# Patient Record
Sex: MALE | Race: WHITE | NOT HISPANIC OR LATINO | Employment: FULL TIME | URBAN - METROPOLITAN AREA
[De-identification: names, ages, dates, MRNs, and addresses within clinical notes are randomized per-mention and may not be internally consistent; named-entity substitution may affect disease eponyms.]

---

## 2017-04-21 ENCOUNTER — COMMUNICATION - HEALTHEAST (OUTPATIENT)
Dept: FAMILY MEDICINE | Facility: CLINIC | Age: 32
End: 2017-04-21

## 2017-04-21 DIAGNOSIS — J30.9 ALLERGIC RHINITIS: ICD-10-CM

## 2017-12-02 ENCOUNTER — APPOINTMENT (OUTPATIENT)
Dept: GENERAL RADIOLOGY | Facility: CLINIC | Age: 32
End: 2017-12-02
Attending: EMERGENCY MEDICINE
Payer: COMMERCIAL

## 2017-12-02 ENCOUNTER — HOSPITAL ENCOUNTER (EMERGENCY)
Facility: CLINIC | Age: 32
Discharge: HOME | End: 2017-12-02
Attending: EMERGENCY MEDICINE | Admitting: EMERGENCY MEDICINE
Payer: COMMERCIAL

## 2017-12-02 VITALS
OXYGEN SATURATION: 97 % | WEIGHT: 170 LBS | SYSTOLIC BLOOD PRESSURE: 123 MMHG | HEIGHT: 75 IN | RESPIRATION RATE: 16 BRPM | BODY MASS INDEX: 21.14 KG/M2 | DIASTOLIC BLOOD PRESSURE: 78 MMHG | TEMPERATURE: 98.3 F

## 2017-12-02 DIAGNOSIS — S33.5XXA LUMBAR SPRAIN, INITIAL ENCOUNTER: ICD-10-CM

## 2017-12-02 DIAGNOSIS — S16.1XXA STRAIN OF NECK MUSCLE, INITIAL ENCOUNTER: ICD-10-CM
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
## 2017-12-02 PROCEDURE — 99284 EMERGENCY DEPT VISIT MOD MDM: CPT | Mod: Z6 | Performed by: EMERGENCY MEDICINE

## 2017-12-02 PROCEDURE — 72040 X-RAY EXAM NECK SPINE 2-3 VW: CPT | Mod: TC

## 2017-12-02 PROCEDURE — 99285 EMERGENCY DEPT VISIT HI MDM: CPT | Mod: 25 | Performed by: EMERGENCY MEDICINE

## 2017-12-02 PROCEDURE — 72100 X-RAY EXAM L-S SPINE 2/3 VWS: CPT | Mod: TC

## 2017-12-02 RX ORDER — NAPROXEN 500 MG/1
500 TABLET ORAL 2 TIMES DAILY PRN
Qty: 30 TABLET | Refills: 1 | Status: SHIPPED | OUTPATIENT
Start: 2017-12-02 | End: 2019-02-11

## 2017-12-02 RX ORDER — CYCLOBENZAPRINE HCL 10 MG
5-10 TABLET ORAL 3 TIMES DAILY PRN
Qty: 30 TABLET | Refills: 1 | Status: SHIPPED | OUTPATIENT
Start: 2017-12-02 | End: 2019-02-11
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
## 2017-12-02 NOTE — ED AVS SNAPSHOT
[facility] Emergency Department    [address] [place] [address]    Phone:  [phone]    Fax:  [phone]                                       Dakota Oliva   MRN: [id number]    Department:  [facility] Emergency Department   Date of Visit:  12/2/2017           After Visit Summary Signature Page     I have received my discharge instructions, and my questions have been answered. I have discussed any challenges I see with this plan with the nurse or doctor.    ..........................................................................................................................................  Patient/Patient Representative Signature      ..........................................................................................................................................  Patient Representative Print Name and Relationship to Patient    ..................................................               ................................................  Date                                            Time    ..........................................................................................................................................  Reviewed by Signature/Title    ...................................................              ..............................................  Date                                                            Time
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
## 2018-12-06 ENCOUNTER — TELEPHONE (OUTPATIENT)
Dept: FAMILY MEDICINE | Facility: CLINIC | Age: 33
End: 2018-12-06

## 2018-12-06 NOTE — TELEPHONE ENCOUNTER
Left message for patient to return call, per Dr. Arrieta. Please transfer when patient returns call.  Ashley Sanders MA

## 2019-01-09 ENCOUNTER — TELEPHONE (OUTPATIENT)
Dept: FAMILY MEDICINE | Facility: CLINIC | Age: 34
End: 2019-01-09

## 2019-01-09 DIAGNOSIS — Z31.41 FERTILITY TESTING: Primary | ICD-10-CM

## 2019-01-14 DIAGNOSIS — Z31.41 FERTILITY TESTING: ICD-10-CM

## 2019-01-14 PROCEDURE — 89322 SEMEN ANAL STRICT CRITERIA: CPT

## 2019-01-15 LAB
ABNORMAL SPERM: 99 MORPHOLOGY
ABSTINENCE DAYS: 3 DAYS (ref 2–7)
AGGLUTINATION: NO YES/NO
ANALYSIS TEMP - CENTIGRADE: 23 CENTIGRADE
CELL FRAGMENTS: ABNORMAL %
COLLECTION METHOD: ABNORMAL
COLLECTION SITE: ABNORMAL
CONSENT TO RELEASE TO PARTNER: YES
HEAD DEFECT: 99
IMMATURE SPERM: ABNORMAL %
IMMOTILE: 47 %
LAB RECEIPT TIME: ABNORMAL
LIQUEFIED: YES YES/NO
MIDPIECE DEFECT: 38
NON-PROGRESSIVE MOTILITY: 3 %
NORMAL SPERM: 1 % NORMAL FORMS (ref 4–?)
PROGRESSIVE MOTILITY: 50 % (ref 32–?)
ROUND CELLS: 0.6 MILLION/ML (ref ?–2)
SPECIMEN CONCENTRATION: 22 MILLION/ML (ref 15–?)
SPECIMEN PH: 7.2 PH (ref 7.2–?)
SPECIMEN TYPE: ABNORMAL
SPECIMEN VOL UR: 4.5 ML (ref 1.5–?)
TAIL DEFECT: 12
TIME OF ANALYSIS: ABNORMAL
TOTAL NUMBER: 99 MILLION (ref 39–?)
TOTAL PROGRESSIVE MOTILE: 50 MILLION (ref 15.6–?)
VISCOUS: NO YES/NO
VITALITY: ABNORMAL % (ref 58–?)
WBC SPECIMEN: ABNORMAL %

## 2019-01-16 NOTE — RESULT ENCOUNTER NOTE
Marya Please inform Dakota/ or caretaker  that this result(s) is/are not quite normal- the counts are low- please refer to Dr Shipman or if he prefers to go to a reproductive endocrinology clinic he can be referred to [facility]-Thanks. PAPO Ruiz MD

## 2019-01-17 ENCOUNTER — TELEPHONE (OUTPATIENT)
Dept: FAMILY MEDICINE | Facility: CLINIC | Age: 34
End: 2019-01-17

## 2019-01-17 DIAGNOSIS — N46.9 MALE FERTILITY PROBLEM: Primary | ICD-10-CM

## 2019-01-18 NOTE — TELEPHONE ENCOUNTER
Referral placed for Urology, [facility]/male infertility program. Message sent to scheduling who will contact patient with the appointment. Vannesa Vasquez LPN

## 2019-01-18 NOTE — TELEPHONE ENCOUNTER
Pt informed of results. He would like to see Reproductive Endocrinology at the [facility] .   Thank you,  Grace Small- Pt Rep.

## 2019-01-18 NOTE — TELEPHONE ENCOUNTER
Left message for patient to return call to clinic.    Please inform patient of abnormal/low sperm counts on recent semen analysis and inquire about which referral patient would like to have.  Marya Mcnamara, CMA

## 2019-01-21 ENCOUNTER — TELEPHONE (OUTPATIENT)
Dept: UROLOGY | Facility: CLINIC | Age: 34
End: 2019-01-21

## 2019-01-21 DIAGNOSIS — Z31.69 INFERTILITY COUNSELING: Primary | ICD-10-CM

## 2019-01-21 NOTE — TELEPHONE ENCOUNTER
I called patient to assist in scheduling appointment with Dr. Gonzalez. No answer. Generic voicemail left asking for a return call.   Will go over the previsit information when patient returns call.

## 2019-01-28 DIAGNOSIS — Z31.69 INFERTILITY COUNSELING: ICD-10-CM

## 2019-01-28 LAB — FSH SERPL-ACNC: 3.2 IU/L (ref 0.7–10.8)

## 2019-01-28 PROCEDURE — 36415 COLL VENOUS BLD VENIPUNCTURE: CPT | Performed by: UROLOGY

## 2019-01-28 PROCEDURE — 84403 ASSAY OF TOTAL TESTOSTERONE: CPT | Performed by: UROLOGY

## 2019-01-28 PROCEDURE — 83001 ASSAY OF GONADOTROPIN (FSH): CPT | Performed by: UROLOGY

## 2019-01-28 PROCEDURE — 84270 ASSAY OF SEX HORMONE GLOBUL: CPT | Performed by: UROLOGY

## 2019-01-28 PROCEDURE — 82670 ASSAY OF TOTAL ESTRADIOL: CPT | Performed by: UROLOGY

## 2019-01-30 LAB
SHBG SERPL-SCNC: 42 NMOL/L (ref 11–80)
TESTOST FREE SERPL-MCNC: 13.73 NG/DL (ref 4.7–24.4)
TESTOST SERPL-MCNC: 713 NG/DL (ref 240–950)

## 2019-02-05 LAB — ESTRADIOL SERPL HS-MCNC: 28 PG/ML (ref 10–40)

## 2019-02-11 ENCOUNTER — OFFICE VISIT (OUTPATIENT)
Dept: UROLOGY | Facility: CLINIC | Age: 34
End: 2019-02-11
Payer: COMMERCIAL

## 2019-02-11 VITALS — SYSTOLIC BLOOD PRESSURE: 116 MMHG | DIASTOLIC BLOOD PRESSURE: 72 MMHG | HEART RATE: 85 BPM | OXYGEN SATURATION: 100 %

## 2019-02-11 DIAGNOSIS — R86.8 TERATOSPERMIA: ICD-10-CM

## 2019-02-11 DIAGNOSIS — Z31.41 FERTILITY TESTING: Primary | ICD-10-CM

## 2019-02-11 PROCEDURE — 99203 OFFICE O/P NEW LOW 30 MIN: CPT | Performed by: UROLOGY

## 2019-02-11 RX ORDER — MIRTAZAPINE 30 MG/1
1 TABLET, FILM COATED ORAL PRN
COMMUNITY
Start: 2016-03-24 | End: 2023-12-02

## 2019-02-11 RX ORDER — TRAZODONE HYDROCHLORIDE 50 MG/1
2 TABLET, FILM COATED ORAL PRN
COMMUNITY
Start: 2016-04-10 | End: 2023-12-02

## 2019-02-11 RX ORDER — DESVENLAFAXINE 25 MG/1
25 TABLET, EXTENDED RELEASE ORAL DAILY
COMMUNITY
Start: 2019-02-08 | End: 2023-12-02

## 2019-02-11 ASSESSMENT — PAIN SCALES - GENERAL: PAINLEVEL: NO PAIN (0)

## 2019-02-11 NOTE — PATIENT INSTRUCTIONS
[facility]  [address]  [place] [address]  [phone]

## 2019-02-11 NOTE — RESULT ENCOUNTER NOTE
Dear Dakota,   Here are your recent results.     Blood labs are all normal, no concerns.    Calculated free ( active) testosterone is 13.7 ng/dL ( >6.5 is preferred), so testosterone level looks great.   There is a normal testosterone to estrogen ratio.  FSH is the signal from the brain to the testicles to drive sperm production.  Your FSH level is normal, I prefer to see this under 7.5 or so.    Thank You  Let me know if you have any questions.    He TY

## 2019-02-11 NOTE — NURSING NOTE
Dakota Oliva's goals for this visit include:   Chief Complaint   Patient presents with     Consult     Infertility       He requests these members of his care team be copied on today's visit information: Yes    PCP: No Ref-Primary, Physician    Referring Provider:  Steven Ruiz MD  [address] [place] [address]    /72 (BP Location: Left arm, Patient Position: Sitting, Cuff Size: Adult Regular)   Pulse 85   SpO2 100%     Do you need any medication refills at today's visit? No

## 2019-02-11 NOTE — PROGRESS NOTES
Dear Dr. Ruiz, it was my pleasure to see . Dakota Oliva, a 33 year old male here in consultation today for fertility evaluation.  His spouse is Iris Oliva age 32 (4/3/87).    This couple has been attempting to conceive for the last 1.5 yrs. They have no previous pregnancy together.  Pregnancies with other partners: none for certain.  They have tried timed intercourse. They have not tried IUI or IVF.    Female factors suspected: None known.  Cycles are regular.  They are concerned about possible endometriosis for her.    PAST MEDICAL HISTORY:    Past Medical History:   Diagnosis Date     Acne      JHONNY (obstructive sleep apnea)      Puberty normal   No associated conditions such as ED or sexual dysfunction.  No  problems.     PAST SURG HISTORY  Past Surgical History:   Procedure Laterality Date     ENT SURGERY  1989    posterior pharyngeal flap surgery    no inguinal hernia repair     Medications as of 2/11/2019:  Current Outpatient Medications   Medication Sig     CLONAZEPAM PO Take 0.5 mg by mouth 3 times daily     desvenlafaxine succinate (PRISTIQ) 25 MG 24 hr tablet Take 25 mg by mouth daily     fluticasone (FLONASE) 50 MCG/ACT nasal spray Spray 2 sprays into both nostrils daily     mirtazapine (REMERON) 30 MG tablet Take 1 tablet by mouth as needed     Multiple Vitamins-Minerals (CENTRUM MEN PO) Take 1 tablet by mouth daily     traZODone (DESYREL) 50 MG tablet Take 2 tablets by mouth as needed     No current facility-administered medications for this visit.       ALLERGY:   No Known Allergies    SOCIAL HISTORY:  . Occupation: dentist.  No alcohol abuse,  tobacco use.   Social History     Tobacco Use     Smoking status: Never Smoker     Smokeless tobacco: Never Used   Substance Use Topics     Alcohol use: Yes     Drug use: No         FAMILY HISTORY: No inherited disorders.   Family History   Problem Relation Age of Onset     Hypertension Mother      Hypertension Father      Cancer Maternal  Grandmother         pancreatic     Cerebrovascular Disease Paternal Grandfather      Cardiovascular Maternal Grandfather        REVIEW OF SYSTEMS:  Significant for feeling well at present.    Denies erectile dysfunction, ejaculatory problems, testicular pain. No vision or smell deficits, no chronic sinus or respiratory infections. No recent febrile illness, weight loss. No heat or cold intolerance, gynecomastia, or other endocrine complaints.    Otherwise, no constitutional, eye, ENT, heart, lung, GI , musculoskeletal, skin, neurologic, psychiatric, or hematologic complaints.    GONADOTOXIN EXPOSURE: + for sauna. Otherwise negative for marijuana, chemicals, pesticides, heavy metals, steroids, chemotherapy or radiation.    GENERAL PHYSICAL EXAM  /72 (BP Location: Left arm, Patient Position: Sitting, Cuff Size: Adult Regular)   Pulse 85   SpO2 100%    Constitutional: No acute distress. Well nourished.   PSYCH: normal mood and affect.  NEURO: normal gait, no focal deficits.   EYES: anicteric, EOMI, PERR  ENT: neck supple,  mucosae moist, no thrush.  CARDIOPULMONARY: breathing non-labored, pulse regular, no peripheral edema.  GI: Abdomen soft, non-tender, nondistended   MUSCULOSKELETAL: normal limb proportions, no muscle wasting, no contractures.  SKIN: Normal virilized hair distribution, no lesions, warts or rashes over genitalia, abdomen extremities or face.  HEME/LYMPH: no ecchymosis, no lymphadenopathy in groin or neck, no lymphedema.     EXAM:  Phallus circumcised, meatus adequate, no plaques palpated.   Left testis descended , size is 15 cc , consistency is normal . No intra-testicular masses.   Right testis descended , size is 15 cc , consistency is normal . No intra-testicular masses.   Epididymes present, non-tender, not enlarged.   Left cord: Vas present. No varicocele noted.  Right cord: Vas present. No varicocele noted. Right cord is slightly almonte but no varicocele noted.    Rectal exam deferred.      Component      Latest Ref Rng & Units 1/14/2019 1/28/2019   Collection Method       Masturbation    Collection Site       TIM    Specimen Type       Semen    Lab Receipt Time       11:24 AM    Time of Analysis       11:40 AM    Analysis Temp - Centigrade      centigrade 23    Abstinence days      2 - 7 days 3    Liquefied      yes/no Yes    Viscous      yes/no No    Agglutination      yes/no No    pH      7.2 pH 7.2    Volume      1.5 ml 4.5    Concentration      15 million/ml 22    Total Number      39 million 99    Progressive motility      32 % 50    Non-progressive motility      % 3    Immotile      % 47    Total Progressive Motile      15.6 million 50    Normal Sperm      4 % normal forms 1 (A)    Abnormal Sperm      morphology 99    Head Defect       99    Midpiece Defect       38    Tail Defect       12    Round Cells      2 million/ml 0.6    Consent to Release to Partner       Yes    Testosterone Total      240 - 950 ng/dL  713   Sex Hormone Binding Globulin      11 - 80 nmol/L  42   Free Testosterone Calculated      4.7 - 24.4 ng/dL  13.73   FSH      0.7 - 10.8 IU/L  3.2   Estradiol Ultrasensitive      10 - 40 pg/mL  28       ASSESSMENT:    Fertility Testing.    Testicular hypofunction     No varicoceles noted    PLAN:    Hormonal panel    Repeat SA at his convenience.    Discussed there is uncertain effect that PRISTIQ may have on sperm function.  There is some evidence of increased sperm DNA fragmentation in men taking Paxil.    I will contact him with results and plan/options.      Thank-you for allowing me to care for your patient.  Sincerely,    Leonardo Gonzalez MD      CC: TAURUS

## 2019-02-11 NOTE — LETTER
2/11/2019       RE: Dakota Oliva  [address] [facility] [id number]     Dear Colleague,    Thank you for referring your patient, Dakota Oliva, to the [facility]. Please see a copy of my visit note below.    Dear Dr. Ruiz, it was my pleasure to see Mr. Dakota Oliva, a 33 year old male here in consultation today for fertility evaluation.  His spouse is Iris Oliva age 32 (4/3/87).    This couple has been attempting to conceive for the last 1.5 yrs. They have no previous pregnancy together.  Pregnancies with other partners: none for certain.  They have tried timed intercourse. They have not tried IUI or IVF.    Female factors suspected: None known.  Cycles are regular.  They are concerned about possible endometriosis for her.    PAST MEDICAL HISTORY:    Past Medical History:   Diagnosis Date     Acne      JHONNY (obstructive sleep apnea)      Puberty normal   No associated conditions such as ED or sexual dysfunction.  No  problems.     PAST SURG HISTORY  Past Surgical History:   Procedure Laterality Date     ENT SURGERY  1989    posterior pharyngeal flap surgery    no inguinal hernia repair     Medications as of 2/11/2019:  Current Outpatient Medications   Medication Sig     CLONAZEPAM PO Take 0.5 mg by mouth 3 times daily     desvenlafaxine succinate (PRISTIQ) 25 MG 24 hr tablet Take 25 mg by mouth daily     fluticasone (FLONASE) 50 MCG/ACT nasal spray Spray 2 sprays into both nostrils daily     mirtazapine (REMERON) 30 MG tablet Take 1 tablet by mouth as needed     Multiple Vitamins-Minerals (CENTRUM MEN PO) Take 1 tablet by mouth daily     traZODone (DESYREL) 50 MG tablet Take 2 tablets by mouth as needed     No current facility-administered medications for this visit.       ALLERGY:   No Known Allergies    SOCIAL HISTORY:  . Occupation: dentist.  No alcohol abuse,  tobacco use.   Social History     Tobacco Use     Smoking status:  Never Smoker     Smokeless tobacco: Never Used   Substance Use Topics     Alcohol use: Yes     Drug use: No         FAMILY HISTORY: No inherited disorders.   Family History   Problem Relation Age of Onset     Hypertension Mother      Hypertension Father      Cancer Maternal Grandmother         pancreatic     Cerebrovascular Disease Paternal Grandfather      Cardiovascular Maternal Grandfather        REVIEW OF SYSTEMS:  Significant for feeling well at present.    Denies erectile dysfunction, ejaculatory problems, testicular pain. No vision or smell deficits, no chronic sinus or respiratory infections. No recent febrile illness, weight loss. No heat or cold intolerance, gynecomastia, or other endocrine complaints.    Otherwise, no constitutional, eye, ENT, heart, lung, GI , musculoskeletal, skin, neurologic, psychiatric, or hematologic complaints.    GONADOTOXIN EXPOSURE: + for sauna. Otherwise negative for marijuana, chemicals, pesticides, heavy metals, steroids, chemotherapy or radiation.    GENERAL PHYSICAL EXAM  /72 (BP Location: Left arm, Patient Position: Sitting, Cuff Size: Adult Regular)   Pulse 85   SpO2 100%    Constitutional: No acute distress. Well nourished.   PSYCH: normal mood and affect.  NEURO: normal gait, no focal deficits.   EYES: anicteric, EOMI, PERR  ENT: neck supple,  mucosae moist, no thrush.  CARDIOPULMONARY: breathing non-labored, pulse regular, no peripheral edema.  GI: Abdomen soft, non-tender, nondistended   MUSCULOSKELETAL: normal limb proportions, no muscle wasting, no contractures.  SKIN: Normal virilized hair distribution, no lesions, warts or rashes over genitalia, abdomen extremities or face.  HEME/LYMPH: no ecchymosis, no lymphadenopathy in groin or neck, no lymphedema.     EXAM:  Phallus circumcised, meatus adequate, no plaques palpated.   Left testis descended , size is 15 cc , consistency is normal . No intra-testicular masses.   Right testis descended , size is 15 cc  , consistency is normal . No intra-testicular masses.   Epididymes present, non-tender, not enlarged.   Left cord: Vas present. No varicocele noted.  Right cord: Vas present. No varicocele noted. Right cord is slightly almonte but no varicocele noted.    Rectal exam deferred.     Component      Latest Ref Rng & Units 1/14/2019 1/28/2019   Collection Method       Masturbation    Collection Site       TIM    Specimen Type       Semen    Lab Receipt Time       11:24 AM    Time of Analysis       11:40 AM    Analysis Temp - Centigrade      centigrade 23    Abstinence days      2 - 7 days 3    Liquefied      yes/no Yes    Viscous      yes/no No    Agglutination      yes/no No    pH      7.2 pH 7.2    Volume      1.5 ml 4.5    Concentration      15 million/ml 22    Total Number      39 million 99    Progressive motility      32 % 50    Non-progressive motility      % 3    Immotile      % 47    Total Progressive Motile      15.6 million 50    Normal Sperm      4 % normal forms 1 (A)    Abnormal Sperm      morphology 99    Head Defect       99    Midpiece Defect       38    Tail Defect       12    Round Cells      2 million/ml 0.6    Consent to Release to Partner       Yes    Testosterone Total      240 - 950 ng/dL  713   Sex Hormone Binding Globulin      11 - 80 nmol/L  42   Free Testosterone Calculated      4.7 - 24.4 ng/dL  13.73   FSH      0.7 - 10.8 IU/L  3.2   Estradiol Ultrasensitive      10 - 40 pg/mL  28       ASSESSMENT:    Fertility Testing.    Testicular hypofunction     No varicoceles noted    PLAN:    Hormonal panel    Repeat SA at his convenience.    Discussed there is uncertain effect that PRISTIQ may have on sperm function.  There is some evidence of increased sperm DNA fragmentation in men taking Paxil.    I will contact him with results and plan/options.      Thank-you for allowing me to care for your patient.  Sincerely,    Leonardo Gonzalez MD      CC: OGI    Again, thank you for allowing me to participate in  the care of your patient.      Sincerely,    Leonardo Gonzalez MD

## 2020-02-20 ENCOUNTER — TELEPHONE (OUTPATIENT)
Dept: UROLOGY | Facility: CLINIC | Age: 35
End: 2020-02-20

## 2020-02-20 NOTE — TELEPHONE ENCOUNTER
Received notification that patient is overdue for semen analysis, strict morphology. Left generic voicemail for patient to return call to clinic. When patient returns call, will give patient the number to set this lab appointment up, or will cancel the order if the patient is no longer interested.    Dianelys Hill LPN

## 2020-02-21 NOTE — TELEPHONE ENCOUNTER
Second attempt to reach patient for overdue labs. Phone line was busy so was unable to leave a message.    Dianelys Hill LPN

## 2020-03-02 ENCOUNTER — HEALTH MAINTENANCE LETTER (OUTPATIENT)
Age: 35
End: 2020-03-02

## 2020-12-20 ENCOUNTER — HEALTH MAINTENANCE LETTER (OUTPATIENT)
Age: 35
End: 2020-12-20

## 2021-04-18 ENCOUNTER — HEALTH MAINTENANCE LETTER (OUTPATIENT)
Age: 36
End: 2021-04-18

## 2021-10-03 ENCOUNTER — HEALTH MAINTENANCE LETTER (OUTPATIENT)
Age: 36
End: 2021-10-03

## 2022-03-01 NOTE — TELEPHONE ENCOUNTER
----- Message from Steven Ruiz MD sent at 1/16/2019  2:27 PM CST -----  Marya Please inform Dakota/ or caretaker  that this result(s) is/are not quite normal- the counts are low- please refer to Dr Shipman or if he prefers to go to a reproductive endocrinology clinic he can be referred to [facility]-Thanks. PAPO Ruiz MD     [facility] Pregnancy And Lactation Text: This medication is Pregnancy Category B and is considered safe. There is some data to suggest avoiding during the third trimester. It is unknown if this medication is excreted in breast milk.

## 2022-05-15 ENCOUNTER — HEALTH MAINTENANCE LETTER (OUTPATIENT)
Age: 37
End: 2022-05-15

## 2022-09-10 ENCOUNTER — HEALTH MAINTENANCE LETTER (OUTPATIENT)
Age: 37
End: 2022-09-10

## 2023-06-03 ENCOUNTER — HEALTH MAINTENANCE LETTER (OUTPATIENT)
Age: 38
End: 2023-06-03

## 2023-12-02 ENCOUNTER — HOSPITAL ENCOUNTER (EMERGENCY)
Facility: CLINIC | Age: 38
Discharge: HOME | End: 2023-12-02
Attending: FAMILY MEDICINE | Admitting: FAMILY MEDICINE
Payer: COMMERCIAL

## 2023-12-02 ENCOUNTER — APPOINTMENT (OUTPATIENT)
Dept: CT IMAGING | Facility: CLINIC | Age: 38
End: 2023-12-02
Attending: FAMILY MEDICINE
Payer: COMMERCIAL

## 2023-12-02 VITALS
BODY MASS INDEX: 25 KG/M2 | RESPIRATION RATE: 18 BRPM | WEIGHT: 200 LBS | OXYGEN SATURATION: 96 % | DIASTOLIC BLOOD PRESSURE: 94 MMHG | SYSTOLIC BLOOD PRESSURE: 135 MMHG | HEART RATE: 111 BPM | TEMPERATURE: 100.3 F

## 2023-12-02 DIAGNOSIS — R11.2 NAUSEA AND VOMITING, UNSPECIFIED VOMITING TYPE: ICD-10-CM

## 2023-12-02 DIAGNOSIS — Z98.890 POSTOPERATIVE STATE: ICD-10-CM

## 2023-12-02 DIAGNOSIS — U07.1 COVID-19 VIRUS INFECTION: ICD-10-CM

## 2023-12-02 DIAGNOSIS — R11.2 NAUSEA AND VOMITING, UNSPECIFIED VOMITING TYPE: Primary | ICD-10-CM

## 2023-12-02 LAB
ALBUMIN SERPL BCG-MCNC: 4.2 G/DL (ref 3.5–5.2)
ALP SERPL-CCNC: 133 U/L (ref 40–150)
ALT SERPL W P-5'-P-CCNC: 37 U/L (ref 0–70)
ANION GAP SERPL CALCULATED.3IONS-SCNC: 12 MMOL/L (ref 7–15)
AST SERPL W P-5'-P-CCNC: 29 U/L (ref 0–45)
BASOPHILS # BLD AUTO: 0 10E3/UL (ref 0–0.2)
BASOPHILS NFR BLD AUTO: 0 %
BILIRUB SERPL-MCNC: 1 MG/DL
BUN SERPL-MCNC: 7 MG/DL (ref 6–20)
CALCIUM SERPL-MCNC: 9.2 MG/DL (ref 8.6–10)
CHLORIDE SERPL-SCNC: 99 MMOL/L (ref 98–107)
CREAT SERPL-MCNC: 1.05 MG/DL (ref 0.67–1.17)
CRP SERPL-MCNC: 240.14 MG/L
DEPRECATED HCO3 PLAS-SCNC: 24 MMOL/L (ref 22–29)
EGFRCR SERPLBLD CKD-EPI 2021: >90 ML/MIN/1.73M2
EOSINOPHIL # BLD AUTO: 0.2 10E3/UL (ref 0–0.7)
EOSINOPHIL NFR BLD AUTO: 3 %
ERYTHROCYTE [DISTWIDTH] IN BLOOD BY AUTOMATED COUNT: 13.3 % (ref 10–15)
GLUCOSE SERPL-MCNC: 120 MG/DL (ref 70–99)
HCT VFR BLD AUTO: 36.2 % (ref 40–53)
HGB BLD-MCNC: 12.1 G/DL (ref 13.3–17.7)
IMM GRANULOCYTES # BLD: 0 10E3/UL
IMM GRANULOCYTES NFR BLD: 0 %
LYMPHOCYTES # BLD AUTO: 0.4 10E3/UL (ref 0.8–5.3)
LYMPHOCYTES NFR BLD AUTO: 5 %
MCH RBC QN AUTO: 28.4 PG (ref 26.5–33)
MCHC RBC AUTO-ENTMCNC: 33.4 G/DL (ref 31.5–36.5)
MCV RBC AUTO: 85 FL (ref 78–100)
MONOCYTES # BLD AUTO: 0.9 10E3/UL (ref 0–1.3)
MONOCYTES NFR BLD AUTO: 12 %
NEUTROPHILS # BLD AUTO: 5.7 10E3/UL (ref 1.6–8.3)
NEUTROPHILS NFR BLD AUTO: 80 %
NRBC # BLD AUTO: 0 10E3/UL
NRBC BLD AUTO-RTO: 0 /100
PLATELET # BLD AUTO: 242 10E3/UL (ref 150–450)
POTASSIUM SERPL-SCNC: 3.7 MMOL/L (ref 3.4–5.3)
PROCALCITONIN SERPL IA-MCNC: 0.2 NG/ML
PROT SERPL-MCNC: 7.2 G/DL (ref 6.4–8.3)
RBC # BLD AUTO: 4.26 10E6/UL (ref 4.4–5.9)
SODIUM SERPL-SCNC: 135 MMOL/L (ref 135–145)
WBC # BLD AUTO: 7.1 10E3/UL (ref 4–11)

## 2023-12-02 PROCEDURE — 99284 EMERGENCY DEPT VISIT MOD MDM: CPT | Performed by: FAMILY MEDICINE

## 2023-12-02 PROCEDURE — 36415 COLL VENOUS BLD VENIPUNCTURE: CPT | Performed by: FAMILY MEDICINE

## 2023-12-02 PROCEDURE — 250N000013 HC RX MED GY IP 250 OP 250 PS 637: Performed by: FAMILY MEDICINE

## 2023-12-02 PROCEDURE — 70486 CT MAXILLOFACIAL W/O DYE: CPT

## 2023-12-02 PROCEDURE — 258N000003 HC RX IP 258 OP 636: Performed by: FAMILY MEDICINE

## 2023-12-02 PROCEDURE — 80053 COMPREHEN METABOLIC PANEL: CPT | Performed by: FAMILY MEDICINE

## 2023-12-02 PROCEDURE — 86140 C-REACTIVE PROTEIN: CPT | Performed by: FAMILY MEDICINE

## 2023-12-02 PROCEDURE — 85025 COMPLETE CBC W/AUTO DIFF WBC: CPT | Performed by: FAMILY MEDICINE

## 2023-12-02 PROCEDURE — 250N000011 HC RX IP 250 OP 636: Mod: JZ | Performed by: FAMILY MEDICINE

## 2023-12-02 PROCEDURE — 96365 THER/PROPH/DIAG IV INF INIT: CPT | Performed by: FAMILY MEDICINE

## 2023-12-02 PROCEDURE — 96375 TX/PRO/DX INJ NEW DRUG ADDON: CPT | Performed by: FAMILY MEDICINE

## 2023-12-02 PROCEDURE — 84145 PROCALCITONIN (PCT): CPT | Performed by: FAMILY MEDICINE

## 2023-12-02 PROCEDURE — 96366 THER/PROPH/DIAG IV INF ADDON: CPT | Performed by: FAMILY MEDICINE

## 2023-12-02 PROCEDURE — 99285 EMERGENCY DEPT VISIT HI MDM: CPT | Mod: 25 | Performed by: FAMILY MEDICINE

## 2023-12-02 RX ORDER — AMPICILLIN AND SULBACTAM 2; 1 G/1; G/1
3 INJECTION, POWDER, FOR SOLUTION INTRAMUSCULAR; INTRAVENOUS EVERY 8 HOURS
Status: CANCELLED
Start: 2023-12-03

## 2023-12-02 RX ORDER — NIRMATRELVIR AND RITONAVIR 300-100 MG
3 KIT ORAL 2 TIMES DAILY
COMMUNITY
Start: 2023-12-02

## 2023-12-02 RX ORDER — PSEUDOEPHEDRINE HCL 120 MG/1
120 TABLET, FILM COATED, EXTENDED RELEASE ORAL EVERY 12 HOURS PRN
COMMUNITY

## 2023-12-02 RX ORDER — AZITHROMYCIN 250 MG/1
500 TABLET, FILM COATED ORAL ONCE
COMMUNITY

## 2023-12-02 RX ORDER — CEFADROXIL 500 MG/1
500 CAPSULE ORAL 2 TIMES DAILY
COMMUNITY
Start: 2023-11-30

## 2023-12-02 RX ORDER — ACETAMINOPHEN 500 MG
1000 TABLET ORAL ONCE
Status: COMPLETED | OUTPATIENT
Start: 2023-12-02 | End: 2023-12-02

## 2023-12-02 RX ORDER — CLONAZEPAM 1 MG/1
1 TABLET ORAL AT BEDTIME
COMMUNITY
Start: 2023-11-03

## 2023-12-02 RX ORDER — MEPERIDINE HYDROCHLORIDE 25 MG/ML
25 INJECTION INTRAMUSCULAR; INTRAVENOUS; SUBCUTANEOUS EVERY 30 MIN PRN
OUTPATIENT
Start: 2023-12-03

## 2023-12-02 RX ORDER — ALBUTEROL SULFATE 0.83 MG/ML
2.5 SOLUTION RESPIRATORY (INHALATION)
OUTPATIENT
Start: 2023-12-03

## 2023-12-02 RX ORDER — MUPIROCIN 20 MG/G
OINTMENT TOPICAL 4 TIMES DAILY
COMMUNITY
Start: 2023-11-27

## 2023-12-02 RX ORDER — DEXAMETHASONE SODIUM PHOSPHATE 10 MG/ML
8 INJECTION, SOLUTION INTRAMUSCULAR; INTRAVENOUS EVERY 8 HOURS
Status: CANCELLED
Start: 2023-12-03

## 2023-12-02 RX ORDER — DIPHENHYDRAMINE HYDROCHLORIDE 50 MG/ML
50 INJECTION INTRAMUSCULAR; INTRAVENOUS
Start: 2023-12-03

## 2023-12-02 RX ORDER — EPINEPHRINE 1 MG/ML
0.3 INJECTION, SOLUTION INTRAMUSCULAR; SUBCUTANEOUS EVERY 5 MIN PRN
OUTPATIENT
Start: 2023-12-03

## 2023-12-02 RX ORDER — DEXAMETHASONE SODIUM PHOSPHATE 10 MG/ML
12 INJECTION, SOLUTION INTRAMUSCULAR; INTRAVENOUS ONCE
Status: COMPLETED | OUTPATIENT
Start: 2023-12-02 | End: 2023-12-02

## 2023-12-02 RX ORDER — ALBUTEROL SULFATE 90 UG/1
1-2 AEROSOL, METERED RESPIRATORY (INHALATION)
Start: 2023-12-03

## 2023-12-02 RX ORDER — HEPARIN SODIUM (PORCINE) LOCK FLUSH IV SOLN 100 UNIT/ML 100 UNIT/ML
5 SOLUTION INTRAVENOUS
OUTPATIENT
Start: 2023-12-03

## 2023-12-02 RX ORDER — OXYCODONE AND ACETAMINOPHEN 7.5; 325 MG/1; MG/1
1 TABLET ORAL EVERY 4 HOURS PRN
COMMUNITY
Start: 2023-11-03

## 2023-12-02 RX ORDER — MULTIVIT WITH MINERALS/LUTEIN
1 TABLET ORAL DAILY
COMMUNITY

## 2023-12-02 RX ORDER — ONDANSETRON 8 MG/1
8 TABLET, ORALLY DISINTEGRATING ORAL EVERY 6 HOURS PRN
COMMUNITY
Start: 2023-11-03

## 2023-12-02 RX ORDER — AMPICILLIN AND SULBACTAM 2; 1 G/1; G/1
3 INJECTION, POWDER, FOR SOLUTION INTRAMUSCULAR; INTRAVENOUS EVERY 8 HOURS
Status: DISCONTINUED | OUTPATIENT
Start: 2023-12-02 | End: 2023-12-03 | Stop reason: HOSPADM

## 2023-12-02 RX ORDER — HEPARIN SODIUM,PORCINE 10 UNIT/ML
5-20 VIAL (ML) INTRAVENOUS DAILY PRN
OUTPATIENT
Start: 2023-12-03

## 2023-12-02 RX ORDER — METHYLPREDNISOLONE SODIUM SUCCINATE 125 MG/2ML
125 INJECTION, POWDER, LYOPHILIZED, FOR SOLUTION INTRAMUSCULAR; INTRAVENOUS
Start: 2023-12-03

## 2023-12-02 RX ORDER — ONDANSETRON 2 MG/ML
4 INJECTION INTRAMUSCULAR; INTRAVENOUS EVERY 30 MIN PRN
Status: DISCONTINUED | OUTPATIENT
Start: 2023-12-02 | End: 2023-12-03 | Stop reason: HOSPADM

## 2023-12-02 RX ORDER — IBUPROFEN 200 MG
800 TABLET ORAL EVERY 6 HOURS PRN
COMMUNITY

## 2023-12-02 RX ADMIN — ONDANSETRON 4 MG: 2 INJECTION INTRAMUSCULAR; INTRAVENOUS at 20:11

## 2023-12-02 RX ADMIN — DEXAMETHASONE SODIUM PHOSPHATE 12 MG: 10 INJECTION, SOLUTION INTRAMUSCULAR; INTRAVENOUS at 19:21

## 2023-12-02 RX ADMIN — AMPICILLIN SODIUM AND SULBACTAM SODIUM 3 G: 2; 1 INJECTION, POWDER, FOR SOLUTION INTRAMUSCULAR; INTRAVENOUS at 19:25

## 2023-12-02 RX ADMIN — SODIUM CHLORIDE 1000 ML: 9 INJECTION, SOLUTION INTRAVENOUS at 19:19

## 2023-12-02 RX ADMIN — ACETAMINOPHEN 1000 MG: 500 TABLET ORAL at 21:40

## 2023-12-02 ASSESSMENT — ACTIVITIES OF DAILY LIVING (ADL)
ADLS_ACUITY_SCORE: 35
ADLS_ACUITY_SCORE: 35

## 2023-12-03 ENCOUNTER — HOSPITAL ENCOUNTER (EMERGENCY)
Facility: CLINIC | Age: 38
Discharge: HOME | End: 2023-12-03
Admitting: FAMILY MEDICINE
Payer: COMMERCIAL

## 2023-12-03 VITALS
RESPIRATION RATE: 18 BRPM | SYSTOLIC BLOOD PRESSURE: 119 MMHG | TEMPERATURE: 98.6 F | DIASTOLIC BLOOD PRESSURE: 76 MMHG | OXYGEN SATURATION: 99 %

## 2023-12-03 DIAGNOSIS — R11.2 NAUSEA AND VOMITING, UNSPECIFIED VOMITING TYPE: ICD-10-CM

## 2023-12-03 PROCEDURE — 96365 THER/PROPH/DIAG IV INF INIT: CPT | Performed by: FAMILY MEDICINE

## 2023-12-03 PROCEDURE — 99281 EMR DPT VST MAYX REQ PHY/QHP: CPT | Performed by: FAMILY MEDICINE

## 2023-12-03 PROCEDURE — 258N000003 HC RX IP 258 OP 636: Performed by: FAMILY MEDICINE

## 2023-12-03 PROCEDURE — 250N000011 HC RX IP 250 OP 636: Mod: JZ | Performed by: FAMILY MEDICINE

## 2023-12-03 PROCEDURE — 96375 TX/PRO/DX INJ NEW DRUG ADDON: CPT | Performed by: FAMILY MEDICINE

## 2023-12-03 RX ORDER — DIPHENHYDRAMINE HYDROCHLORIDE 50 MG/ML
50 INJECTION INTRAMUSCULAR; INTRAVENOUS
Start: 2023-12-03

## 2023-12-03 RX ORDER — MEPERIDINE HYDROCHLORIDE 25 MG/ML
25 INJECTION INTRAMUSCULAR; INTRAVENOUS; SUBCUTANEOUS EVERY 30 MIN PRN
OUTPATIENT
Start: 2023-12-03

## 2023-12-03 RX ORDER — AMPICILLIN AND SULBACTAM 2; 1 G/1; G/1
3 INJECTION, POWDER, FOR SOLUTION INTRAMUSCULAR; INTRAVENOUS EVERY 8 HOURS
Status: DISCONTINUED | OUTPATIENT
Start: 2023-12-03 | End: 2023-12-03 | Stop reason: HOSPADM

## 2023-12-03 RX ORDER — ALBUTEROL SULFATE 0.83 MG/ML
2.5 SOLUTION RESPIRATORY (INHALATION)
OUTPATIENT
Start: 2023-12-03

## 2023-12-03 RX ORDER — DEXAMETHASONE SODIUM PHOSPHATE 10 MG/ML
8 INJECTION, SOLUTION INTRAMUSCULAR; INTRAVENOUS EVERY 8 HOURS
Start: 2023-12-03

## 2023-12-03 RX ORDER — AMPICILLIN AND SULBACTAM 2; 1 G/1; G/1
3 INJECTION, POWDER, FOR SOLUTION INTRAMUSCULAR; INTRAVENOUS EVERY 8 HOURS
Start: 2023-12-03

## 2023-12-03 RX ORDER — METHYLPREDNISOLONE SODIUM SUCCINATE 125 MG/2ML
125 INJECTION, POWDER, LYOPHILIZED, FOR SOLUTION INTRAMUSCULAR; INTRAVENOUS
Start: 2023-12-03

## 2023-12-03 RX ORDER — EPINEPHRINE 1 MG/ML
0.3 INJECTION, SOLUTION INTRAMUSCULAR; SUBCUTANEOUS EVERY 5 MIN PRN
OUTPATIENT
Start: 2023-12-03

## 2023-12-03 RX ORDER — ALBUTEROL SULFATE 90 UG/1
1-2 AEROSOL, METERED RESPIRATORY (INHALATION)
Start: 2023-12-03

## 2023-12-03 RX ORDER — HEPARIN SODIUM (PORCINE) LOCK FLUSH IV SOLN 100 UNIT/ML 100 UNIT/ML
5 SOLUTION INTRAVENOUS
OUTPATIENT
Start: 2023-12-03

## 2023-12-03 RX ORDER — DEXAMETHASONE SODIUM PHOSPHATE 10 MG/ML
8 INJECTION, SOLUTION INTRAMUSCULAR; INTRAVENOUS EVERY 8 HOURS
Status: DISCONTINUED | OUTPATIENT
Start: 2023-12-03 | End: 2023-12-03 | Stop reason: HOSPADM

## 2023-12-03 RX ORDER — HEPARIN SODIUM,PORCINE 10 UNIT/ML
5-20 VIAL (ML) INTRAVENOUS DAILY PRN
OUTPATIENT
Start: 2023-12-03

## 2023-12-03 RX ADMIN — AMPICILLIN SODIUM AND SULBACTAM SODIUM 3 G: 2; 1 INJECTION, POWDER, FOR SOLUTION INTRAMUSCULAR; INTRAVENOUS at 07:25

## 2023-12-03 RX ADMIN — DEXAMETHASONE SODIUM PHOSPHATE 8 MG: 10 INJECTION, SOLUTION INTRAMUSCULAR; INTRAVENOUS at 07:22

## 2023-12-03 RX ADMIN — SODIUM CHLORIDE 1000 ML: 9 INJECTION, SOLUTION INTRAVENOUS at 07:25

## 2023-12-03 ASSESSMENT — ACTIVITIES OF DAILY LIVING (ADL): ADLS_ACUITY_SCORE: 35

## 2023-12-03 NOTE — DISCHARGE INSTRUCTIONS
Please return for IV antibiotics (Unasyn) every 8 hours for 3 days and Decadron for 4 more doses.  These infusions will likely be done in the emergency department if its after hours, but he may be sent to infusion department for your medications.  Stop your Zithromax.  Follow-up with  on Monday to give him an update.  Once you are done with your IV antibiotics, start Augmentin and take for an additional 5 days.  Monitor for further symptoms and return to the ED at any time as needed.

## 2023-12-03 NOTE — MEDICATION SCRIBE - ADMISSION MEDICATION HISTORY
Medication Scribe Admission Medication History    Admission medication history is complete. The information provided in this note is only as accurate as the sources available at the time of the update.    Information Source(s): Patient and CareEverywhere/SureScripts via in-person    Pertinent Information: n/a    Changes made to PTA medication list:  Added: zpak, cefadroxil, bactroban, paxlovid, percocet, sudafed, zofran, ibuprofen  Deleted: pristiq, remeron, trazodone  Changed: clonazepam to 1mg dose once daily at bedtime, flonase uses BID, takes centrum silver    Medication Affordability:  Not including over the counter (OTC) medications, was there a time in the past 3 months when you did not take your medications as prescribed because of cost?: No    Allergies reviewed with patient and updates made in EHR: yes    Medication History Completed By: BOB LIZAMA 12/2/2023 7:58 PM    PTA Med List   Medication Sig Note Last Dose    azithromycin (ZITHROMAX) 250 MG tablet Take 500 mg by mouth once And 250mg daily the following four days 12/2/2023: Possible sinus infection 12/2/2023 at pm vomited    cefadroxil (DURICEF) 500 MG capsule Take 500 mg by mouth 2 times daily   at not started    clonazePAM (KLONOPIN) 1 MG tablet Take 1 mg by mouth at bedtime  12/1/2023 at hs    fluticasone (FLONASE) 50 MCG/ACT nasal spray Spray 2 sprays into both nostrils daily (Patient taking differently: Spray 2 sprays into both nostrils 2 times daily)  12/2/2023 at pm    ibuprofen (ADVIL/MOTRIN) 200 MG tablet Take 800 mg by mouth every 6 hours as needed for pain  12/2/2023 at pm    multivitamin (CENTRUM SILVER) tablet Take 1 tablet by mouth daily  12/2/2023 at am    mupirocin (BACTROBAN) 2 % external ointment Apply topically 4 times daily  12/2/2023 at pm    ondansetron (ZOFRAN ODT) 8 MG ODT tab Take 8 mg by mouth every 6 hours as needed for nausea  12/2/2023 at am    oxyCODONE-acetaminophen (PERCOCET) 7.5-325 MG per tablet Take 1 tablet by  mouth every 4 hours as needed for severe pain  12/1/2023 at hs    PAXLOVID, 300/100, 20 x 150 MG & 10 x 100MG therapy pack Take 3 tablets by mouth 2 times daily  12/2/2023 at am    pseudoePHEDrine (SUDAFED) 120 MG 12 hr tablet Take 120 mg by mouth every 12 hours as needed for congestion  12/2/2023 at pm

## 2023-12-03 NOTE — ED TRIAGE NOTES
Patient is here with nausea and vomiting. He recently had an extensive surgery on his jaw 3 weeks ago. He was diagnosed with covid 2 days ago. He was given paxlovid today but is unable to keep anything down.      Triage Assessment (Adult)       Row Name 12/02/23 2927          Triage Assessment    Airway WDL WDL        Respiratory WDL    Respiratory WDL X;cough     Cough Frequency infrequent     Cough Type congested        Skin Circulation/Temperature WDL    Skin Circulation/Temperature WDL WDL        Cardiac WDL    Cardiac WDL WDL        Peripheral/Neurovascular WDL    Peripheral Neurovascular WDL WDL        Cognitive/Neuro/Behavioral WDL    Cognitive/Neuro/Behavioral WDL WDL

## 2023-12-03 NOTE — ED TRIAGE NOTES
Patient here for IV decadron, fluids and antibiotics. Orders released and patient needs a new IV.

## 2023-12-03 NOTE — ED PROVIDER NOTES
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  He is thrown up about 7 times.  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  He had extensive surgery to his jaw and sinuses Dr. Chris Gamble [phone].  Patient states that he has had significant weight gain since his surgery back on #7, 3 and half weeks ago.  His recent bouts of nausea and vomiting has made him more weak.  He has been able to keep down some fluids and is making urine.  He has not had any diarrhea.  He had some abdominal pain earlier but that has resolved.  He reports that he had some new onset swelling of the right cheek.  He had eustachian tube pressure and held his nose and did a Valsalva.  He has been in touch with his maxillofacial surgeon.  Patient was started on Zithromax and Paxlovid and was able to get 1 dose down this morning.  I spoke with  who performed the max low mandibular advancement surgery.  He is concerned that patient may have complications from the surgery with his recent COVID and right-sided sinus and facial swelling.  He states that it takes a month at least for healing to take place and he is concerned that there could be an infectious complication given his current presentation.  He recommended that we begin IV antibiotics with Unasyn and continue this for 3 days.      Problem List:  Patient Active Problem List    Diagnosis Date Noted    Nausea and vomiting, unspecified vomiting type 12/02/2023     Priority: Medium    Chronic nasal congestion 09/06/2013     Priority: Medium        Past Medical History:   Diagnosis Date    Acne     JHONNY (obstructive sleep apnea)        MEDS: [START ON 12/5/2023] amoxicillin-clavulanate (AUGMENTIN) 875-125 MG tablet  azithromycin (ZITHROMAX) 250 MG tablet  cefadroxil (DURICEF) 500 MG capsule  clonazePAM (KLONOPIN) 1 MG tablet  fluticasone (FLONASE) 50 MCG/ACT nasal spray  ibuprofen (ADVIL/MOTRIN) 200 MG tablet  multivitamin (CENTRUM SILVER) tablet  mupirocin (BACTROBAN) 2 % external ointment  ondansetron (ZOFRAN ODT) 8 MG ODT tab  oxyCODONE-acetaminophen (PERCOCET) 7.5-325 MG per tablet  PAXLOVID, 300/100, 20 x 150 MG & 10 x 100MG therapy pack  pseudoePHEDrine (SUDAFED) 120 MG 12 hr tablet        ALLERGIES:  No Known Allergies    Past Surgical History:   Procedure Laterality Date    ENT SURGERY  1989    posterior pharyngeal flap surgery       Social History     Tobacco Use    Smoking status: Never    Smokeless tobacco: Never   Substance Use Topics    Alcohol use: Yes    Drug use: No         Review of Systems   Except as noted in HPI, all other systems were reviewed and are negative    Physical Exam   Vitals were reviewed  Patient Vitals for the past 12 hrs:   BP Temp Temp src Pulse Resp SpO2 Weight   12/02/23 2130 -- 100.3  F (37.9  C) Axillary 111 -- 96 % --   12/02/23 1940 -- -- -- -- -- 99 % --   12/02/23 1935 -- -- -- -- -- 97 % --   12/02/23 1818 (!) 135/94 98.9  F (37.2  C) Temporal (!) 139 18 96 % 90.7 kg (200 lb)     GENERAL APPEARANCE: Alert and oriented x 3, no acute distress  FACE: normal facies: Right cheek swelling with increased warmth and redness  EYES: Pupils are equal  HENT: normal external exam  NECK: no adenopathy or asymmetry  RESP: normal respiratory effort; clear breath sounds bilaterally  CV: regular rhythm, rapid rate; no appreciable murmurs  ABD: soft, no tenderness; no rebound or guarding; bowel sounds are normal  MS: no gross deformities noted; normal muscle tone.  SKIN: Right cheek is slightly erythematous and warm to the  touch  NEURO: no facial droop; no focal deficits, speech is normal        Available Lab/Imaging Results     Results for orders placed or performed during the hospital encounter of 12/02/23 (from the past 24 hour(s))   CBC with platelets differential    Narrative    The following orders were created for panel order CBC with platelets differential.  Procedure                               Abnormality         Status                     ---------                               -----------         ------                     CBC with platelets and d...[[id number]]  Abnormal            Final result                 Please view results for these tests on the individual orders.   Comprehensive metabolic panel   Result Value Ref Range    Sodium 135 135 - 145 mmol/L    Potassium 3.7 3.4 - 5.3 mmol/L    Carbon Dioxide (CO2) 24 22 - 29 mmol/L    Anion Gap 12 7 - 15 mmol/L    Urea Nitrogen 7.0 6.0 - 20.0 mg/dL    Creatinine 1.05 0.67 - 1.17 mg/dL    GFR Estimate >90 >60 mL/min/1.73m2    Calcium 9.2 8.6 - 10.0 mg/dL    Chloride 99 98 - 107 mmol/L    Glucose 120 (H) 70 - 99 mg/dL    Alkaline Phosphatase 133 40 - 150 U/L    AST 29 0 - 45 U/L    ALT 37 0 - 70 U/L    Protein Total 7.2 6.4 - 8.3 g/dL    Albumin 4.2 3.5 - 5.2 g/dL    Bilirubin Total 1.0 <=1.2 mg/dL   CRP inflammation   Result Value Ref Range    CRP Inflammation 240.14 (H) <5.00 mg/L   Procalcitonin   Result Value Ref Range    Procalcitonin 0.20 <0.50 ng/mL   CBC with platelets and differential   Result Value Ref Range    WBC Count 7.1 4.0 - 11.0 10e3/uL    RBC Count 4.26 (L) 4.40 - 5.90 10e6/uL    Hemoglobin 12.1 (L) 13.3 - 17.7 g/dL    Hematocrit 36.2 (L) 40.0 - 53.0 %    MCV 85 78 - 100 fL    MCH 28.4 26.5 - 33.0 pg    MCHC 33.4 31.5 - 36.5 g/dL    RDW 13.3 10.0 - 15.0 %    Platelet Count 242 150 - 450 10e3/uL    % Neutrophils 80 %    % Lymphocytes 5 %    % Monocytes 12 %    % Eosinophils 3 %    % Basophils 0 %    % Immature Granulocytes 0 %    NRBCs per 100 WBC 0 <1  /100    Absolute Neutrophils 5.7 1.6 - 8.3 10e3/uL    Absolute Lymphocytes 0.4 (L) 0.8 - 5.3 10e3/uL    Absolute Monocytes 0.9 0.0 - 1.3 10e3/uL    Absolute Eosinophils 0.2 0.0 - 0.7 10e3/uL    Absolute Basophils 0.0 0.0 - 0.2 10e3/uL    Absolute Immature Granulocytes 0.0 <=0.4 10e3/uL    Absolute NRBCs 0.0 10e3/uL   CT Facial Bones without Contrast    Narrative    EXAM: CT FACIAL BONES WITHOUT CONTRAST  LOCATION: [facility]  DATE: 12/2/2023    INDICATION: Postoperative swelling of the right cheek. Recent maxillomandibular advancement surgery 3 weeks ago.  Positive COVID 2 days ago.  COMPARISON: None.  TECHNIQUE: Routine CT Maxillofacial without IV contrast. Multiplanar reformats. Dose reduction techniques were used.     FINDINGS:  OSSEOUS STRUCTURES/SOFT TISSUES: Limited assessment of the soft tissues without intravenous contrast. Postsurgical changes related to recent maxillomandibular advancement surgery with overall sharply marginated osteotomy lines with subtle scattered   callus formation, indicative of early ongoing healing. No evidence of solid bony bridging. Multiple screw and plate constructs transfixing the maxilla and mandible bilaterally with orthodontic braces in place. No hardware fracture or appreciable   periprosthetic lucency. Loculated fluid surrounding the right maxilla along the right buccal and retroantral regions contiguous with sinus contents, as described below. Similar but milder findings in the left retroantral space. A focus of air adjacent to   the lateral-most screw head along the right malar eminence (series 2 image 74). Mild asymmetric subcutaneous fat stranding overlying the right mandible and maxilla, the latter extending medially to the right nasolabial fold. Notably, the parotid glands   are grossly unremarkable without ductal dilatation. Borderline enlarged bilateral cervical lymph nodes, likely reactive. No temporomandibular joint malalignment. No  dental periapical abscess.    ORBITAL CONTENTS: No acute intraorbital abnormality.    SINUSES/MASTOIDS: Mucosal thickening scattered about the paranasal sinuses, worst and moderate in the right greater than the left maxillary sinuses where there are secretions and air-fluid levels. Trace mucosal thickening/secretions within the right   nasal passage. No mastoid or middle ear effusion.    VISUALIZED INTRACRANIAL CONTENTS: Within technique limitations, no acute abnormality.       Impression    IMPRESSION:   1.  Limited assessment of the soft tissues without intravenous contrast.  2.  Postoperative changes related to recent maxillomandibular advancement surgery with loculated fluid surrounding the right-greater-than-left maxillary sinuses which appears contiguous with the sinus contents through osteotomy defects. Mild asymmetric   subcutaneous fat stranding overlying the right mandible and maxilla. A punctate focus of air adjacent to the lateral-most screw head at the right malar eminence. While findings may represent developing infection, they remain nonspecific; evolving   postoperative changes may have a similar appearance. Correlate clinically. Repeat facial bone CT with IV contrast may be helpful for further assessment.  3.  Pansinus mucosal thickening, worst and moderate in the right-greater-than-left maxillary sinuses where there are air-fluid levels. Similarly, findings are nonspecific and may be postoperative in nature or represent superimposed acute sinusitis.  4.  No hardware displacement or periprosthetic lucency.                Impression     Final diagnoses:   Nausea and vomiting   COVID-19 virus infection   Postoperative state (Maxillo-mandibular advancement)         ED Course & Medical Decision Making   Dakota Oliva is a 38 year old male who underwent recent maxillofacial surgery, 3 and half weeks ago, and was diagnosed with COVID 2 days ago who presented to the emergency department with acute onset of  nausea and vomiting.  Patient had symptoms on Thursday, resolved on Friday, and today symptoms recurred.  He thinks he is thrown up about 7 times.  He has had some eustachian tube dysfunction and during this time did perform a Valsalva maneuver to clear his ears.  He developed onset of right cheek swelling during this time.  His surgeon started him on Zithromax.  Patient took an initial dose of Paxlovid this morning.    Vital signs reveal a temp of 98.9, blood pressure 135/94, heart rate of 139, respiratory rate of 18 with 97% oxygen saturation.  On exam, patient has some right cheek swelling with increased warmth and redness.  No subcutaneous crepitus.  I spoke with Dr. Gamble, the patient's surgeon, and he raised concerns about possible infectious complications.  He wanted the patient to begin IV antibiotics, and Decadron.    Laboratory workup reveals white blood count of 7.1, hemoglobin of 12.1, platelet count of 242.  Comprehensive metabolic panel is normal except for nonfasting glucose of 120.  CRP is high at 240.  Procalcitonin 0.20.  CT scan of the maxillofacial bones were performed.    Results of the CT scan is noted above.  There are some postoperative changes, mild asymmetric subcutaneous fat stranding over the right mandible and maxilla.  A punctate focus of air adjacent to the lateralmost screw head at the right Maller eminence.  Pansinus mucosal thickening worst and moderate in the right greater than left maxillary sinus.  These results were relayed to .    Patient received the following medications in the emergency department.:    Medications   ondansetron (ZOFRAN) injection 4 mg (4 mg Intravenous $Given 12/2/23 2011)   ampicillin-sulbactam (UNASYN) 3 g vial to attach to  mL bag (0 g Intravenous Stopped 12/2/23 2109)   sodium chloride 0.9% BOLUS 1,000 mL (0 mLs Intravenous Stopped 12/2/23 2109)   dexAMETHasone PF (DECADRON) injection 12 mg (12 mg Intravenous $Given 12/2/23 1921)    acetaminophen (TYLENOL) tablet 1,000 mg (1,000 mg Oral $Given 12/2/23 5909)      Outpatient infusion therapy was arranged with the help of our inpatient pharmacist.  Patient will return every 8 hours for Unasyn for 8 more doses, Decadron 8 mg IV every 8 hours for 4 more doses, and a liter of normal saline at each visit.  Patient will then complete 5 days of Augmentin at the completion of his IV antibiotics.  He will touch base with his surgeon in the next couple of days.  Patient has access to a panoramic scan at work and will likely be in touch for follow-up with his general surgeon.  Return to the emergency department at any time if symptoms worsen.        Written after-visit summary and instructions were given at the time of discharge.      Discharge Instructions:   Please return for IV antibiotics (Unasyn) every 8 hours for 3 days and Decadron for 4 more doses.  These infusions will likely be done in the emergency department if its after hours, but he may be sent to infusion department for your medications.  Stop your Zithromax.  Follow-up with  on Monday to give him an update.  Once you are done with your IV antibiotics, start Augmentin and take for an additional 5 days.  Monitor for further symptoms and return to the ED at any time as needed.       Disclaimer: This note consists of words and symbols derived from keyboarding and dictation using voice recognition software.  As a result, there may be errors that have gone undetected.  Please consider this when interpreting information found in this note.       Chase Salgado MD  12/02/23 1932

## 2024-07-07 ENCOUNTER — HEALTH MAINTENANCE LETTER (OUTPATIENT)
Age: 39
End: 2024-07-07

## 2025-07-13 ENCOUNTER — HEALTH MAINTENANCE LETTER (OUTPATIENT)
Age: 40
End: 2025-07-13